# Patient Record
Sex: MALE | Race: WHITE
[De-identification: names, ages, dates, MRNs, and addresses within clinical notes are randomized per-mention and may not be internally consistent; named-entity substitution may affect disease eponyms.]

---

## 2021-01-25 ENCOUNTER — HOSPITAL ENCOUNTER (OUTPATIENT)
Dept: HOSPITAL 47 - ORWHC2ENDO | Age: 56
Discharge: HOME | End: 2021-01-25
Attending: INTERNAL MEDICINE
Payer: MEDICAID

## 2021-01-25 VITALS — HEART RATE: 69 BPM | SYSTOLIC BLOOD PRESSURE: 120 MMHG | DIASTOLIC BLOOD PRESSURE: 74 MMHG

## 2021-01-25 VITALS — RESPIRATION RATE: 16 BRPM | TEMPERATURE: 98.2 F

## 2021-01-25 DIAGNOSIS — Z98.890: ICD-10-CM

## 2021-01-25 DIAGNOSIS — Z79.899: ICD-10-CM

## 2021-01-25 DIAGNOSIS — I10: ICD-10-CM

## 2021-01-25 DIAGNOSIS — Z12.11: Primary | ICD-10-CM

## 2021-01-25 DIAGNOSIS — Z79.82: ICD-10-CM

## 2021-01-25 DIAGNOSIS — Z88.5: ICD-10-CM

## 2021-01-25 DIAGNOSIS — Z83.71: ICD-10-CM

## 2021-01-25 DIAGNOSIS — D12.3: ICD-10-CM

## 2021-01-25 DIAGNOSIS — K62.1: ICD-10-CM

## 2021-01-25 PROCEDURE — 88305 TISSUE EXAM BY PATHOLOGIST: CPT

## 2021-01-25 PROCEDURE — 45380 COLONOSCOPY AND BIOPSY: CPT

## 2021-01-25 PROCEDURE — 45385 COLONOSCOPY W/LESION REMOVAL: CPT

## 2021-01-25 NOTE — P.PCN
Date of Procedure: 01/25/21


Description of Procedure: 


BRIEF HISTORY: 


Patient is a 55-year-old pleasant male presenting for outpatient colonoscopy for

screening for malignant neoplasm of the colon.  No change in bowel habits or 

abdominal pain.  No family history of colon cancer but he does report colon 

polyps in his father and sister.





PROCEDURE PERFORMED: 


Colonoscopy with polypectomy. 





PREOPERATIVE DIAGNOSIS: 


Screening for malignant neoplasm in the colon, no prior colonoscopy. 





ESTIMATED BLOOD LOSS: 


Minimal.





IV sedation per Anesthesia. 





PROCEDURE: 


After informed consent was obtained, the patient, was brought into the endoscopy

unit. IV sedation was administered by Anesthesia under continuous monitoring.  

Digital rectal examination was normal. Initially the Olympus CF-190 flexible 

video colonoscope was then inserted in the rectum, gradually advanced into the 

cecum without any difficulty. Careful examination was performed as the scope was

gradually being withdrawn. Ileocecal valve and the appendiceal orifice were 

visualized and appeared normal.  Prep was excellent. Mucosa of the cecum, 

ascending colon, transverse colon, descending colon, sigmoid colon, and rectum 

appeared normal.  A 4 mm hepatic flexure polyp removed with cold snare 

polypectomy.  2 diminutive 1 mm polyp removed with cold forcep polypectomy from 

the sigmoid colon and rectum.. Retroflexion was performed in the rectum and no 

lesions were seen, low-grade internal hemorrhoids. The patient tolerated the 

procedure well. 





IMPRESSION: 


Sessile hepatic flexure polyp removed with cold snare polypectomy.


2 diminutive polyps removed from the sigmoid colon and rectum with cold forcep 

polypectomy.


Otherwise normal-appearing colon from rectum to cecum.





RECOMMENDATIONS:  


Findings of this examination were discussed with the patient..  Okay to resume 

diet.  Okay to resume medications.  Await pathology from polypectomies.  

Recommend repeat colonoscopy in 5-7 years pending pathology from polypectomies.